# Patient Record
Sex: FEMALE | Race: BLACK OR AFRICAN AMERICAN | NOT HISPANIC OR LATINO | URBAN - METROPOLITAN AREA
[De-identification: names, ages, dates, MRNs, and addresses within clinical notes are randomized per-mention and may not be internally consistent; named-entity substitution may affect disease eponyms.]

---

## 2017-01-18 ENCOUNTER — TELEPHONE (OUTPATIENT)
Dept: ENDOCRINOLOGY | Facility: CLINIC | Age: 32
End: 2017-01-18

## 2017-01-18 NOTE — TELEPHONE ENCOUNTER
----- Message from Mirian Radha sent at 1/17/2017  4:46 PM CST -----  Contact: Rachel    tel:   [phone]  Caller says:  Wants to see Dr. Mccall and get established as a pt. W/ her.      Pls call w/ an appt..    Will be getting all of her information together to bring to the appt..   Will be waiting to hear from you.